# Patient Record
Sex: FEMALE | Race: WHITE | NOT HISPANIC OR LATINO | URBAN - METROPOLITAN AREA
[De-identification: names, ages, dates, MRNs, and addresses within clinical notes are randomized per-mention and may not be internally consistent; named-entity substitution may affect disease eponyms.]

---

## 2017-02-11 ENCOUNTER — EMERGENCY (EMERGENCY)
Facility: HOSPITAL | Age: 27
LOS: 1 days | Discharge: HOME | End: 2017-02-11
Attending: EMERGENCY MEDICINE | Admitting: EMERGENCY MEDICINE
Payer: COMMERCIAL

## 2017-02-11 VITALS
DIASTOLIC BLOOD PRESSURE: 88 MMHG | SYSTOLIC BLOOD PRESSURE: 129 MMHG | OXYGEN SATURATION: 98 % | HEIGHT: 66.14 IN | RESPIRATION RATE: 17 BRPM | TEMPERATURE: 98 F | WEIGHT: 127.87 LBS | HEART RATE: 95 BPM

## 2017-02-11 PROCEDURE — 99283 EMERGENCY DEPT VISIT LOW MDM: CPT

## 2017-02-11 RX ORDER — AZTREONAM 2 G
1 VIAL (EA) INJECTION
Qty: 14 | Refills: 0 | OUTPATIENT
Start: 2017-02-11 | End: 2017-02-18

## 2017-02-11 RX ORDER — IBUPROFEN 200 MG
600 TABLET ORAL ONCE
Qty: 0 | Refills: 0 | Status: COMPLETED | OUTPATIENT
Start: 2017-02-11 | End: 2017-02-11

## 2017-02-11 RX ADMIN — Medication 600 MILLIGRAM(S): at 23:29

## 2017-02-11 RX ADMIN — Medication 1 TABLET(S): at 23:29

## 2017-02-11 NOTE — ED PROVIDER NOTE - OBJECTIVE STATEMENT
25 y/o F with no known sig PMHx states "I have a UTI" since yesterday. She endorses urinary urgency, frequency and constant, aching right lower back pain with N/V. No aggravating/alleviating factors. States he sx's feel similar to previous UTI in the past which improved with ABX.. Has not taken any meds yet.     Denies fever, chills, dizziness, syncope, CP, SOB, palpitations, abdo pain, hematemesis, diarrhea, hematochezia, melena, dysuria, gross hematuria, abnormal vaginal bleeding or discharge 25 y/o F with no known sig PMHx states "I have a UTI" since yesterday. She endorses urinary urgency, frequency and constant, aching, localized right lower back pain with N/V. No aggravating/alleviating factors. States he sx's feel similar to previous UTI in the past which improved with ABX.. Has not taken any meds yet.     Denies fever, chills, dizziness, syncope, CP, SOB, palpitations, abdo pain, hematemesis, diarrhea, hematochezia, melena, dysuria, gross hematuria, abnormal vaginal bleeding or discharge

## 2017-02-11 NOTE — ED PROVIDER NOTE - MEDICAL DECISION MAKING DETAILS
+ bacteria, WBCs and Leuk Est on UA. First dose of Bactrim DS given in ED. Pt A&Ox3. NAD. Afebrile, nontoxic. Sitting comfortably. Will D/C on Rx Bactrim DS with supportive tx instructions. Strict return precautions reviewed with pt in which pt verbalizes understanding and agrees to.

## 2017-02-15 DIAGNOSIS — R39.15 URGENCY OF URINATION: ICD-10-CM

## 2017-02-15 DIAGNOSIS — N39.0 URINARY TRACT INFECTION, SITE NOT SPECIFIED: ICD-10-CM

## 2022-09-27 NOTE — ED ADULT NURSE NOTE - BREATH SOUNDS, MLM
Clear
c/o lightheaded and dizzy x 3 weeks, +tingling to bilateral hands and feet, +headache x few days
